# Patient Record
Sex: MALE | Race: WHITE | Employment: FULL TIME | URBAN - METROPOLITAN AREA
[De-identification: names, ages, dates, MRNs, and addresses within clinical notes are randomized per-mention and may not be internally consistent; named-entity substitution may affect disease eponyms.]

---

## 2023-12-15 ENCOUNTER — HOSPITAL ENCOUNTER (OUTPATIENT)
Facility: HOSPITAL | Age: 33
Setting detail: OUTPATIENT SURGERY
Discharge: HOME | End: 2023-12-15
Attending: SURGERY | Admitting: SURGERY
Payer: COMMERCIAL

## 2023-12-15 ENCOUNTER — ANESTHESIA EVENT (OUTPATIENT)
Facility: HOSPITAL | Age: 33
End: 2023-12-15
Payer: COMMERCIAL

## 2023-12-15 ENCOUNTER — ANESTHESIA (OUTPATIENT)
Facility: HOSPITAL | Age: 33
End: 2023-12-15
Payer: COMMERCIAL

## 2023-12-15 VITALS
HEIGHT: 70 IN | OXYGEN SATURATION: 100 % | RESPIRATION RATE: 18 BRPM | TEMPERATURE: 98.2 F | BODY MASS INDEX: 33.8 KG/M2 | HEART RATE: 72 BPM | DIASTOLIC BLOOD PRESSURE: 89 MMHG | WEIGHT: 236.1 LBS | SYSTOLIC BLOOD PRESSURE: 129 MMHG

## 2023-12-15 PROCEDURE — 2500000003 HC RX 250 WO HCPCS: Performed by: NURSE ANESTHETIST, CERTIFIED REGISTERED

## 2023-12-15 PROCEDURE — 2580000003 HC RX 258: Performed by: ANESTHESIOLOGY

## 2023-12-15 PROCEDURE — 6360000002 HC RX W HCPCS: Performed by: NURSE ANESTHETIST, CERTIFIED REGISTERED

## 2023-12-15 RX ORDER — LIDOCAINE HYDROCHLORIDE 20 MG/ML
INJECTION, SOLUTION EPIDURAL; INFILTRATION; INTRACAUDAL; PERINEURAL PRN
Status: DISCONTINUED | OUTPATIENT
Start: 2023-12-15 | End: 2023-12-15 | Stop reason: SDUPTHER

## 2023-12-15 RX ORDER — ROSUVASTATIN CALCIUM 10 MG/1
10 TABLET, COATED ORAL DAILY
COMMUNITY
Start: 2023-09-18

## 2023-12-15 RX ORDER — SODIUM CHLORIDE 0.9 % (FLUSH) 0.9 %
5-40 SYRINGE (ML) INJECTION EVERY 12 HOURS SCHEDULED
Status: CANCELLED | OUTPATIENT
Start: 2023-12-15

## 2023-12-15 RX ORDER — NAPROXEN 250 MG/1
250 TABLET ORAL 2 TIMES DAILY WITH MEALS
COMMUNITY

## 2023-12-15 RX ORDER — SODIUM CHLORIDE 9 MG/ML
INJECTION, SOLUTION INTRAVENOUS PRN
Status: DISCONTINUED | OUTPATIENT
Start: 2023-12-15 | End: 2023-12-15 | Stop reason: HOSPADM

## 2023-12-15 RX ORDER — PROPOFOL 10 MG/ML
INJECTION, EMULSION INTRAVENOUS PRN
Status: DISCONTINUED | OUTPATIENT
Start: 2023-12-15 | End: 2023-12-15 | Stop reason: SDUPTHER

## 2023-12-15 RX ORDER — SODIUM CHLORIDE 0.9 % (FLUSH) 0.9 %
5-40 SYRINGE (ML) INJECTION PRN
Status: CANCELLED | OUTPATIENT
Start: 2023-12-15

## 2023-12-15 RX ADMIN — LIDOCAINE HYDROCHLORIDE 100 MG: 20 INJECTION, SOLUTION EPIDURAL; INFILTRATION; INTRACAUDAL; PERINEURAL at 09:26

## 2023-12-15 RX ADMIN — SODIUM CHLORIDE 100 ML/HR: 9 INJECTION, SOLUTION INTRAVENOUS at 08:23

## 2023-12-15 RX ADMIN — PROPOFOL 50 MG: 10 INJECTION, EMULSION INTRAVENOUS at 09:35

## 2023-12-15 RX ADMIN — PROPOFOL 100 MCG/KG/MIN: 10 INJECTION, EMULSION INTRAVENOUS at 09:29

## 2023-12-15 RX ADMIN — PROPOFOL 50 MG: 10 INJECTION, EMULSION INTRAVENOUS at 09:26

## 2023-12-15 ASSESSMENT — PAIN - FUNCTIONAL ASSESSMENT
PAIN_FUNCTIONAL_ASSESSMENT: 0-10
PAIN_FUNCTIONAL_ASSESSMENT: NONE - DENIES PAIN

## 2023-12-15 NOTE — H&P
Assessment/Plan  # Detail Type Description    1. Assessment Hematochezia (K92.1). Impression evaluation with colonoscopy. Patient Plan Pt is an average risk patient presenting for colonoscopy and colon cancer screening. Pt risk was determined based on NCCN guidelines V1.2016. Risks benefits and alternatives of colonoscopy were discussed to include FOBT, barium enema, CT virtual colonoscopy. Risk of perforation, bleeding or gas pain, missed polyps, inability to complete full colonoscopy, anesthesia complications discussed. Expectations of preoperative bowel prep /procedure described. Literature was given to the patient and reviewed. The patient showed understanding and agreed with the plan as above. Extended time was taken to answer all patient questions. Extra pre-procedure paperwork and counseling completed. 2. Assessment Third degree hemorrhoids (K64.2). Impression Discussed all surgical options for hemorrhoids to include (non-surgical conservative management), RBL, THD and excisional hemorrhoidectomy - the differences in effectiveness, pain, recovery time, recurrence and primary focused location of effect. Pt understands that if chooses excision, it is a very painful procedure and full control of pain will not be possible and repeat narcotics will not be provided. Very specific, strict post-operative regimen was reviewed with patient for success. .    Provider Plan  Bowel hygiene discussed to avoid constipation and extended toilet time. Pt understood problems will worsen or recur without these changes. Defecation problems can also be a result of pelvic floor disorders which would require anal biofeedback consult at a later date if continued difficulty. Avoid baby wipes and skin trauma. Recommend Calmoseptine for perianal skin protection and balneol for perianal cleansing. Recommended magnesium powder supplement and fiber gummies.  Laxatives if required to avoid continuing complication Extremity Normal No edema. Neurological Normal Memory - Normal. Cranial nerves - Cranial nerves II through XII grossly intact. Psychiatric Normal Orientation - Oriented to time, place, person & situation. Appropriate mood and affect. Normal insight. Normal judgment.      Immunizations Entered by History  Date Immunization   3/10/2021 12:00:00 AM influenza virus vaccine, unspecified formulation   9/30/2019 12:00:00 AM influenza, injectable, quadrivalent, contains preservative   11/17/2008 12:00:00 AM tetanus and diphtheria toxoids, adsorbed, preservative free, for adult use (2 Lf of tetanus toxoid and 2 Lf of diphtheria toxoid)   10/11/2000 12:00:00 AM measles, mumps and rubella virus vaccine   3/19/2001 12:00:00 AM hepatitis B vaccine, adult dosage   10/16/2000 12:00:00 AM hepatitis B vaccine, adult dosage   7/22/1996 12:00:00 AM hepatitis B vaccine, pediatric or pediatric/adolescent dosage   10/4/2018 12:00:00 AM Influenza, injectable, quadrivalent, preservative free     Medications (added, continued, or stopped this visit)  Start Date Medication Directions PRN Status PRN Reason Instruction Stop Date   03/29/2018 Benadryl Allergy 25 mg tablet take 1 tablet by oral route  every 4 - 6 hours as needed N      09/18/2023 rosuvastatin 10 mg tablet take 1 tablet by oral route  every day N      04/14/2022 Ventolin HFA 90 mcg/actuation aerosol inhaler inhale 1 puff by inhalation route  every 4 - 6 hours as needed N      09/18/2023 Vyvanse 40 mg capsule take 1 capsule by oral route  every day in the morning N  Rx 1 of 3

## 2023-12-15 NOTE — ANESTHESIA PRE PROCEDURE
\"CO2\", \"BUN\", \"CREATININE\", \"GFRAA\", \"AGRATIO\", \"LABGLOM\", \"GLUCOSE\", \"GLU\", \"PROT\", \"CALCIUM\", \"BILITOT\", \"ALKPHOS\", \"AST\", \"ALT\"    POC Tests: No results for input(s): \"POCGLU\", \"POCNA\", \"POCK\", \"POCCL\", \"POCBUN\", \"POCHEMO\", \"POCHCT\" in the last 72 hours. Coags: No results found for: \"PROTIME\", \"INR\", \"APTT\"    HCG (If Applicable): No results found for: \"PREGTESTUR\", \"PREGSERUM\", \"HCG\", \"HCGQUANT\"     ABGs: No results found for: \"PHART\", \"PO2ART\", \"YEX0MTU\", \"KUS8XPI\", \"BEART\", \"C1ZZYEXV\"     Type & Screen (If Applicable):  No results found for: \"LABABO\", \"LABRH\"    Drug/Infectious Status (If Applicable):  No results found for: \"HIV\", \"HEPCAB\"    COVID-19 Screening (If Applicable): No results found for: \"COVID19\"        Anesthesia Evaluation  Patient summary reviewed and Nursing notes reviewed  Airway: Mallampati: III  TM distance: >3 FB     Mouth opening: > = 3 FB   Dental: normal exam         Pulmonary:normal exam  breath sounds clear to auscultation  (+)           current smoker                           Cardiovascular:Negative CV ROS  Exercise tolerance: good (>4 METS)          Rhythm: regular  Rate: normal                    Neuro/Psych:   Negative Neuro/Psych ROS              GI/Hepatic/Renal:   (+) GERD: well controlled          Endo/Other: Negative Endo/Other ROS                    Abdominal:             Vascular: negative vascular ROS. Other Findings:         Anesthesia Plan      MAC     ASA 2             Anesthetic plan and risks discussed with patient. Plan discussed with CRNA.                 Bassem Jay MD   12/15/2023

## 2023-12-15 NOTE — ANESTHESIA POSTPROCEDURE EVALUATION
Department of Anesthesiology  Postprocedure Note    Patient: Paul Mercado  MRN: [id number]  [address] 1990  Date of evaluation: 12/15/2023    Procedure Summary       Date: 12/15/23 Room / Location: [facility] ENDO  / [facility] ENDOSCOPY    Anesthesia Start: 0919 Anesthesia Stop: 3574    Procedure: COLONOSCOPY WITH POSSIBLE BIOPSIES OR OTHER THERAPIES: POLYPECTOMY (Lower GI Region) Diagnosis:       Rectal bleeding      (Rectal bleeding [K62.5])    Surgeons: Todd Magaña DO Responsible Provider: Eleuterio Gamez MD    Anesthesia Type: MAC ASA Status: 2            Anesthesia Type: MAC    Mireya Phase I: Mireya Score: 10    Mireya Phase II: Mireya Score: 10    Anesthesia Post Evaluation    Patient location during evaluation: PACU  Patient participation: complete - patient participated  Level of consciousness: awake  Pain score: 0  Airway patency: patent  Nausea & Vomiting: no nausea and no vomiting  Cardiovascular status: blood pressure returned to baseline  Respiratory status: acceptable  Hydration status: euvolemic  Pain management: adequate    No notable events documented.

## 2023-12-15 NOTE — DISCHARGE INSTRUCTIONS
Carolyn Click  [id number]  1990    COLON DISCHARGE INSTRUCTIONS    Discomfort:  Redness at IV site- apply warm compress to area; if redness or soreness persist- contact your physician  There may be a slight amount of blood passed from the rectum  Gaseous discomfort- walking, belching will help relieve any discomfort  You should not operate a vehicle for 12 hours  You should not engage in an occupation involving machinery or appliances for rest of today  You may not drink alcoholic beverages for at least 12 hours  Avoid making any critical decisions for at least 24 hour  DIET:   Regular or resume diet normally appropriate for you. - however -  remember your colon is empty and a heavy meal will produce gas. Avoid these foods:  vegetables, fried / greasy foods, carbonated drinks for today     ACTIVITY:  You may resume your normal daily activities it is recommended that you spend the remainder of the day resting -  avoid any strenuous activity. CALL M.D. ANY SIGN OF:   Increasing pain, nausea, vomiting  Abdominal distension (swelling)  New increased bleeding (oral or rectal)  Fever (chills)  Pain in chest area  Bloody discharge from nose or mouth  Shortness of breath      Follow-up Instructions:   If biopsies were taken, please call Dr. Bell  office in next 2 weeks, [phone]. Otherwise follow up with your PCM. DISCHARGE SUMMARY from Nurse    PATIENT INSTRUCTIONS:    After general anesthesia or intravenous sedation, for 24 hours or while taking prescription Narcotics:  Limit your activities  Do not drive and operate hazardous machinery  Do not make important personal or business decisions  Do  not drink alcoholic beverages  If you have not urinated within 8 hours after discharge, please contact your surgeon on call.     Report the following to your surgeon:  Excessive pain, swelling, redness or odor of or around the surgical area  Temperature over 100.5  Nausea and vomiting lasting longer